# Patient Record
Sex: MALE | Race: WHITE
[De-identification: names, ages, dates, MRNs, and addresses within clinical notes are randomized per-mention and may not be internally consistent; named-entity substitution may affect disease eponyms.]

---

## 2017-03-21 ENCOUNTER — HOSPITAL ENCOUNTER (OUTPATIENT)
Dept: HOSPITAL 47 - LABWHC1 | Age: 52
End: 2017-03-21
Payer: COMMERCIAL

## 2017-03-21 DIAGNOSIS — Z00.00: Primary | ICD-10-CM

## 2017-03-21 DIAGNOSIS — E03.9: ICD-10-CM

## 2017-03-21 DIAGNOSIS — I10: ICD-10-CM

## 2017-03-21 LAB
ALP SERPL-CCNC: 59 U/L (ref 38–126)
ALT SERPL-CCNC: 30 U/L (ref 21–72)
ANION GAP SERPL CALC-SCNC: 12 MMOL/L
AST SERPL-CCNC: 20 U/L (ref 17–59)
BUN SERPL-SCNC: 23 MG/DL (ref 9–20)
CALCIUM SPEC-MCNC: 10.1 MG/DL (ref 8.4–10.2)
CELLS COUNTED: 100
CH: 30.2
CHCM: 33.5
CHLORIDE SERPL-SCNC: 104 MMOL/L (ref 98–107)
CO2 SERPL-SCNC: 28 MMOL/L (ref 22–30)
ERYTHROCYTE [DISTWIDTH] IN BLOOD BY AUTOMATED COUNT: 5.2 M/UL (ref 4.3–5.9)
ERYTHROCYTE [DISTWIDTH] IN BLOOD: 13.2 % (ref 11.5–15.5)
GLUCOSE SERPL-MCNC: 88 MG/DL (ref 74–99)
HCT VFR BLD AUTO: 47.1 % (ref 39–53)
HDW: 2.38
HGB BLD-MCNC: 15.5 GM/DL (ref 13–17.5)
MCH RBC QN AUTO: 29.8 PG (ref 25–35)
MCHC RBC AUTO-ENTMCNC: 32.9 G/DL (ref 31–37)
MCV RBC AUTO: 90.5 FL (ref 80–100)
NON-AFRICAN AMERICAN GFR(MDRD): >60
POTASSIUM SERPL-SCNC: 4.8 MMOL/L (ref 3.5–5.1)
PROT SERPL-MCNC: 7.7 G/DL (ref 6.3–8.2)
SODIUM SERPL-SCNC: 144 MMOL/L (ref 137–145)
WBC # BLD AUTO: 5.5 K/UL (ref 3.8–10.6)
WBC (PEROX): 5.37

## 2017-03-21 PROCEDURE — 85025 COMPLETE CBC W/AUTO DIFF WBC: CPT

## 2017-03-21 PROCEDURE — 80061 LIPID PANEL: CPT

## 2017-03-21 PROCEDURE — 36415 COLL VENOUS BLD VENIPUNCTURE: CPT

## 2017-03-21 PROCEDURE — 84443 ASSAY THYROID STIM HORMONE: CPT

## 2017-03-21 PROCEDURE — 80053 COMPREHEN METABOLIC PANEL: CPT

## 2017-03-21 PROCEDURE — 84439 ASSAY OF FREE THYROXINE: CPT

## 2017-03-22 LAB
CHOLEST SERPL-MCNC: 156 MG/DL (ref ?–200)
HDLC SERPL-MCNC: 33 MG/DL (ref 40–60)
TRIGL SERPL-MCNC: 80 MG/DL (ref ?–150)

## 2017-05-18 ENCOUNTER — HOSPITAL ENCOUNTER (OUTPATIENT)
Dept: HOSPITAL 47 - ORWHC2ENDO | Age: 52
Discharge: HOME | End: 2017-05-18
Payer: COMMERCIAL

## 2017-05-18 VITALS — RESPIRATION RATE: 18 BRPM | SYSTOLIC BLOOD PRESSURE: 113 MMHG | DIASTOLIC BLOOD PRESSURE: 76 MMHG | HEART RATE: 71 BPM

## 2017-05-18 VITALS — BODY MASS INDEX: 23.6 KG/M2

## 2017-05-18 VITALS — TEMPERATURE: 97.3 F

## 2017-05-18 DIAGNOSIS — Z12.11: Primary | ICD-10-CM

## 2017-05-18 DIAGNOSIS — Z79.899: ICD-10-CM

## 2017-05-18 DIAGNOSIS — E07.9: ICD-10-CM

## 2017-05-18 DIAGNOSIS — I10: ICD-10-CM

## 2017-05-18 NOTE — P.PCN
Date of Procedure: 05/18/17


Preoperative Diagnosis: 





Postoperative Diagnosis: 





Procedure(s) Performed: 


Procedure: Total colonoscopy.





Preoperative diagnosis: Screening for neoplasia.





Postoperative diagnosis: Exam within normal limits.





Preparation: HalfLytely prep.





Sedation: Was provided by anesthesia.





Brief clinical history: The patient is a 52-year-old male who is referred for 

this evaluation for screening for neoplasia age being his risk factor.  He has 

no abdominal complaints, bleeding or anemia.  There is no family history of 

colon cancer.  This would be his first colonoscopy.





Procedure: With the patient on his left lateral decubitus position and after 

informed consent and adequate sedation, the perianal area was inspected and it 

did not show any fissures or fistulas.  There were no masses felt on digital 

rectal examination.  The Olympus CFQ 160L video colonoscope was then inserted 

in the rectum in the usual fashion and advanced to the cecum.  The mucosa 

appeared healthy.  No polyps or tumors were seen or any obvious diverticular 

disease.  I retroflexed the endoscope in the rectum before the endoscope was 

withdrawn.





The patient tolerated the procedure well.





Plan: The patient was reassured.  He will follow up with you as planned and I 

recommended repeat exam in 10 years.


Implants: 





Indications for Procedure: 





Operative Findings: 





Description of Procedure:

## 2017-06-30 ENCOUNTER — HOSPITAL ENCOUNTER (OUTPATIENT)
Dept: HOSPITAL 47 - OR | Age: 52
Discharge: HOME | End: 2017-06-30
Attending: SURGERY
Payer: COMMERCIAL

## 2017-06-30 VITALS — TEMPERATURE: 97.7 F

## 2017-06-30 VITALS — HEART RATE: 87 BPM | SYSTOLIC BLOOD PRESSURE: 123 MMHG | DIASTOLIC BLOOD PRESSURE: 79 MMHG

## 2017-06-30 VITALS — RESPIRATION RATE: 16 BRPM

## 2017-06-30 VITALS — BODY MASS INDEX: 23.6 KG/M2

## 2017-06-30 DIAGNOSIS — I10: ICD-10-CM

## 2017-06-30 DIAGNOSIS — E03.9: ICD-10-CM

## 2017-06-30 DIAGNOSIS — K40.20: Primary | ICD-10-CM

## 2017-06-30 DIAGNOSIS — Z79.899: ICD-10-CM

## 2017-06-30 DIAGNOSIS — Z79.891: ICD-10-CM

## 2017-06-30 PROCEDURE — 49650 LAP ING HERNIA REPAIR INIT: CPT

## 2017-06-30 RX ADMIN — POTASSIUM CHLORIDE SCH MLS: 14.9 INJECTION, SOLUTION INTRAVENOUS at 16:25

## 2017-06-30 RX ADMIN — POTASSIUM CHLORIDE SCH MLS: 14.9 INJECTION, SOLUTION INTRAVENOUS at 11:36

## 2017-06-30 NOTE — P.OP
Date of Procedure: 06/30/17


Preoperative Diagnosis: 


Bilateral inguinal hernia


Postoperative Diagnosis: 


Right direct inguinal hernia 


Left large sliding indirect inguinal hernia containing sigmoid colon


Procedure(s) Performed: 


Robotoc asisst lap bilateral inguinal hernia repair with mesh


Implants: 


Progrip 15x10 cm Ref [id number] Lot [id number]


Progrip 15x10 cm Ref [id number]


Anesthesia: GETA


Surgeon: Eleanor Garcia


Pathology: none sent


Condition: stable


Disposition: PACU


Indications for Procedure: 


52 yrs old male presents with bilateral inguinal hernia. Informed consent 

obtained forrobotic assist lap bilateral inguinal hernia repair with mesh 

possible open


Operative Findings: 





Description of Procedure: 


The patient was brought to the operating room and placed in supine position.  

General anesthesia with endotracheal intubation was performed as per anesthesia 

team.  Both arms were tucked against the abdominal wall and a shunt was 

positioned in lithotomy using yellowfin stirrups.  A curiel catheter was 

inserted under sterile aseptic precautions.  Chlorhexidine was used to prep the 

skin followed by application of sterile drapes and Ioban dressing.





A timeout was performed to verify correct patient, correct procedure and 

correct side.  Patient was confirmed to receive perioperative IV antibiotics, 

subcutaneous heparin 5000 units and bilateral SCDs were placed.





A 2 mm skin incision was made in the left subcostal area and Veress needle was 

inserted to establish pneumoperitoneum to a pressure of 15 mmHg.  A 1.5 cm 

supraumbilical incision was made which was deepened through the subcutaneous 

tissue . Two additional 8  mm skin incisions were made on either side of the 

midline approximately 8 cm away.  A 5 mm 30 laparoscope was used to enter the 

peritoneum using direct Optiview technique.  A 8 mm robotic trocar was inserted 

in the subumbilical area and 8 mm robotic trocars were inserted on either side 

of the midline.


The patient was placed in Trendelenburg position  and the robot was brought in 

between the legs.  The robotic arms including the camera arm were docked on the 

trocars.  The robotic prograsp and monopolar scissors were introduced via arm 1 

and 2 respectively.





Upon inspection of the peritoneal cavity, bilateral direct hernias identified.  

The right hernia contained incarcerated omentum which was reduced using gentle 

traction and countertractionThe key anatomical landmarks including the pubic 

symphysis, median and medial umbilical ligaments and bilateral epigastric 

vessels were identified.  Attention was focused on the left side first.  





Using monopolar scissors a peritoneal flap was created extending medially from 

the median umbilical ligament and laterally to the direct hernia space.  Using 

gentle traction and countertraction the flap was developed posteriorly.  Loose 

fibrofatty tissue was bluntly dissected.  Medially the dissection was carried 

along the Lizandro's ligament till pubic tubercle was identified.  Care was taken 

to stay away from the urinary bladder.  Dissection was carried out to leave the 

epigastric vessels against the anterior abdominal wall and laterally beyond the 

hernia defect.  The direct hernia sac was completely reduced.  The iliofemoral 

vessels were identified.  The peritoneal reflection overlying the spermatic 

cord was also dissected off.  Care was taken not to injure any gonadal vessels 

or spermatic cord.  Enough inferior dissection was carried out 2 cm below the 

hernia defect.  No indirect hernia noted.





Attention was then focused on the right side where similar dissection was 

performed to identify the above-mentioned key anatomical landmarks.  Gentle 

traction and countertraction helped in reduction of the hernia sac.  No 

bleeding was encountered.  The iliofemoral vessels, spermatic cord and vas 

deferens were protected at all times.





Laparoscopic parietex progrip mesh was rolled and introduced through the 8mm 

camera port.  The right mesh was placed in the preperitoneal cavity with green 

portion overlying the pubic tubercle . The mesh was rolled upwards so that the 

mesh covered the direct , indirect inguinal hernia and the femoral hernia space 

without any kinks or folds.  Similarly left parietex progrip mesh was rolled 

and introduced through the 8mm camera port and secured over the right direct, 

indirect and femoral hernia space.





The peritoneal flap was then sutured to the cut edge of the peritoneum using 

continuous 2-0 V lock sutures.  The hernia sacs were completely reduced and the 

mesh lay flat without any kinks or folds.





The robotic arms were then undocked and 30 degree laparoscope was inserted.  

All the needles were removed from the abdominal cavity.  The 8mm camera trocar 

site was closed with 2 transfascial sutures of 0 Vicryl.  The sponge, 

instrument and needle count were correct x2.  





The skin was closed with interrupted sutures of 4-0 Monocryl.  Dermabond skin 

glue was applied followed by Telfa and Tegaderm dressing.





Curiel catheter was removed and scrotum was palpated to confirm the position of 

the testicles.  The patient tolerated the procedure well and was taken to post 

anesthesia care unit in stable condition





Dr. Jain was the surgical orta who was present and scrubbed in during the 

entire case.

## 2018-09-18 ENCOUNTER — HOSPITAL ENCOUNTER (OUTPATIENT)
Dept: HOSPITAL 47 - LABWHC1 | Age: 53
End: 2018-09-18
Attending: INTERNAL MEDICINE
Payer: COMMERCIAL

## 2018-09-18 DIAGNOSIS — Z00.00: Primary | ICD-10-CM

## 2018-09-18 DIAGNOSIS — E03.9: ICD-10-CM

## 2018-09-18 DIAGNOSIS — Z12.5: ICD-10-CM

## 2018-09-18 DIAGNOSIS — I10: ICD-10-CM

## 2018-09-18 LAB
ALBUMIN SERPL-MCNC: 4.4 G/DL (ref 3.5–5)
ALP SERPL-CCNC: 59 U/L (ref 38–126)
ALT SERPL-CCNC: 29 U/L (ref 21–72)
ANION GAP SERPL CALC-SCNC: 12 MMOL/L
AST SERPL-CCNC: 24 U/L (ref 17–59)
BASOPHILS # BLD AUTO: 0 K/UL (ref 0–0.2)
BASOPHILS NFR BLD AUTO: 1 %
BUN SERPL-SCNC: 22 MG/DL (ref 9–20)
CALCIUM SPEC-MCNC: 9.9 MG/DL (ref 8.4–10.2)
CHLORIDE SERPL-SCNC: 105 MMOL/L (ref 98–107)
CHOLEST SERPL-MCNC: 157 MG/DL (ref ?–200)
CO2 SERPL-SCNC: 24 MMOL/L (ref 22–30)
EOSINOPHIL # BLD AUTO: 0.1 K/UL (ref 0–0.7)
EOSINOPHIL NFR BLD AUTO: 2 %
ERYTHROCYTE [DISTWIDTH] IN BLOOD BY AUTOMATED COUNT: 5.4 M/UL (ref 4.3–5.9)
ERYTHROCYTE [DISTWIDTH] IN BLOOD: 13.2 % (ref 11.5–15.5)
GLUCOSE SERPL-MCNC: 94 MG/DL (ref 74–99)
HCT VFR BLD AUTO: 48.2 % (ref 39–53)
HDLC SERPL-MCNC: 30 MG/DL (ref 40–60)
HGB BLD-MCNC: 16 GM/DL (ref 13–17.5)
LDLC SERPL CALC-MCNC: 109 MG/DL (ref 0–99)
LYMPHOCYTES # SPEC AUTO: 1.9 K/UL (ref 1–4.8)
LYMPHOCYTES NFR SPEC AUTO: 32 %
MCH RBC QN AUTO: 29.7 PG (ref 25–35)
MCHC RBC AUTO-ENTMCNC: 33.2 G/DL (ref 31–37)
MCV RBC AUTO: 89.3 FL (ref 80–100)
MONOCYTES # BLD AUTO: 0.4 K/UL (ref 0–1)
MONOCYTES NFR BLD AUTO: 7 %
NEUTROPHILS # BLD AUTO: 3.2 K/UL (ref 1.3–7.7)
NEUTROPHILS NFR BLD AUTO: 54 %
PLATELET # BLD AUTO: 330 K/UL (ref 150–450)
POTASSIUM SERPL-SCNC: 4.7 MMOL/L (ref 3.5–5.1)
PROT SERPL-MCNC: 7.8 G/DL (ref 6.3–8.2)
PSA SERPL-MCNC: 0.56 NG/ML (ref 0–4)
SODIUM SERPL-SCNC: 141 MMOL/L (ref 137–145)
T4 FREE SERPL-MCNC: 1.01 NG/DL (ref 0.78–2.19)
TRIGL SERPL-MCNC: 92 MG/DL (ref ?–150)
WBC # BLD AUTO: 6 K/UL (ref 3.8–10.6)

## 2018-09-18 PROCEDURE — 85025 COMPLETE CBC W/AUTO DIFF WBC: CPT

## 2018-09-18 PROCEDURE — 84443 ASSAY THYROID STIM HORMONE: CPT

## 2018-09-18 PROCEDURE — 80053 COMPREHEN METABOLIC PANEL: CPT

## 2018-09-18 PROCEDURE — 84439 ASSAY OF FREE THYROXINE: CPT

## 2018-09-18 PROCEDURE — 80061 LIPID PANEL: CPT

## 2018-09-18 PROCEDURE — 36415 COLL VENOUS BLD VENIPUNCTURE: CPT

## 2019-09-17 ENCOUNTER — HOSPITAL ENCOUNTER (OUTPATIENT)
Dept: HOSPITAL 47 - LABWHC1 | Age: 54
Discharge: HOME | End: 2019-09-17
Attending: INTERNAL MEDICINE
Payer: COMMERCIAL

## 2019-09-17 DIAGNOSIS — Z12.5: ICD-10-CM

## 2019-09-17 DIAGNOSIS — E78.5: ICD-10-CM

## 2019-09-17 DIAGNOSIS — Z00.00: Primary | ICD-10-CM

## 2019-09-17 DIAGNOSIS — I10: ICD-10-CM

## 2019-09-17 LAB
ALBUMIN SERPL-MCNC: 4.6 G/DL (ref 3.8–4.9)
ALBUMIN/GLOB SERPL: 2 G/DL (ref 1.6–3.17)
ALP SERPL-CCNC: 67 U/L (ref 41–126)
ALT SERPL-CCNC: 20 U/L (ref 10–49)
ANION GAP SERPL CALC-SCNC: 6.3 MMOL/L (ref 4–12)
AST SERPL-CCNC: 23 U/L (ref 14–35)
BASOPHILS # BLD AUTO: 0.1 K/UL (ref 0–0.2)
BASOPHILS NFR BLD AUTO: 1 %
BUN SERPL-SCNC: 23 MG/DL (ref 9–27)
BUN/CREAT SERPL: 28.75 RATIO (ref 12–20)
CALCIUM SPEC-MCNC: 9.5 MG/DL (ref 8.7–10.3)
CHLORIDE SERPL-SCNC: 106 MMOL/L (ref 96–109)
CHOLEST SERPL-MCNC: 147 MG/DL (ref 0–200)
CO2 SERPL-SCNC: 26.7 MMOL/L (ref 21.6–31.8)
EOSINOPHIL # BLD AUTO: 0.1 K/UL (ref 0–0.7)
EOSINOPHIL NFR BLD AUTO: 1 %
ERYTHROCYTE [DISTWIDTH] IN BLOOD BY AUTOMATED COUNT: 5.1 M/UL (ref 4.3–5.9)
ERYTHROCYTE [DISTWIDTH] IN BLOOD: 13.5 % (ref 11.5–15.5)
GLOBULIN SER CALC-MCNC: 2.3 G/DL (ref 1.6–3.3)
GLUCOSE SERPL-MCNC: 90 MG/DL (ref 70–110)
HCT VFR BLD AUTO: 45.4 % (ref 39–53)
HDLC SERPL-MCNC: 36 MG/DL (ref 40–60)
HGB BLD-MCNC: 15.4 GM/DL (ref 13–17.5)
LDLC SERPL CALC-MCNC: 93 MG/DL (ref 0–131)
LYMPHOCYTES # SPEC AUTO: 1.9 K/UL (ref 1–4.8)
LYMPHOCYTES NFR SPEC AUTO: 29 %
MCH RBC QN AUTO: 30.1 PG (ref 25–35)
MCHC RBC AUTO-ENTMCNC: 33.9 G/DL (ref 31–37)
MCV RBC AUTO: 88.9 FL (ref 80–100)
MONOCYTES # BLD AUTO: 0.4 K/UL (ref 0–1)
MONOCYTES NFR BLD AUTO: 6 %
NEUTROPHILS # BLD AUTO: 3.9 K/UL (ref 1.3–7.7)
NEUTROPHILS NFR BLD AUTO: 59 %
PLATELET # BLD AUTO: 255 K/UL (ref 150–450)
POTASSIUM SERPL-SCNC: 4.1 MMOL/L (ref 3.5–5.5)
PROT SERPL-MCNC: 6.9 G/DL (ref 6.2–8.2)
SODIUM SERPL-SCNC: 139 MMOL/L (ref 135–145)
T4 FREE SERPL-MCNC: 1.1 NG/DL (ref 0.8–1.8)
TRIGL SERPL-MCNC: 90 MG/DL (ref 0–149)
VLDLC SERPL CALC-MCNC: 18 MG/DL (ref 5–40)
WBC # BLD AUTO: 6.6 K/UL (ref 3.8–10.6)

## 2019-09-17 PROCEDURE — 36415 COLL VENOUS BLD VENIPUNCTURE: CPT

## 2019-09-17 PROCEDURE — 80053 COMPREHEN METABOLIC PANEL: CPT

## 2019-09-17 PROCEDURE — 84443 ASSAY THYROID STIM HORMONE: CPT

## 2019-09-17 PROCEDURE — 84439 ASSAY OF FREE THYROXINE: CPT

## 2019-09-17 PROCEDURE — 85025 COMPLETE CBC W/AUTO DIFF WBC: CPT

## 2019-09-17 PROCEDURE — 80061 LIPID PANEL: CPT

## 2020-06-09 ENCOUNTER — HOSPITAL ENCOUNTER (OUTPATIENT)
Dept: HOSPITAL 47 - LABWHC1 | Age: 55
Discharge: HOME | End: 2020-06-09
Attending: INTERNAL MEDICINE
Payer: COMMERCIAL

## 2020-06-09 DIAGNOSIS — Z12.5: ICD-10-CM

## 2020-06-09 DIAGNOSIS — I10: ICD-10-CM

## 2020-06-09 DIAGNOSIS — E03.9: ICD-10-CM

## 2020-06-09 DIAGNOSIS — Z00.00: Primary | ICD-10-CM

## 2020-06-09 LAB
ALBUMIN SERPL-MCNC: 4.4 G/DL (ref 3.8–4.9)
ALBUMIN/GLOB SERPL: 1.83 G/DL (ref 1.6–3.17)
ALP SERPL-CCNC: 77 U/L (ref 41–126)
ALT SERPL-CCNC: 61 U/L (ref 10–49)
ANION GAP SERPL CALC-SCNC: 7.9 MMOL/L (ref 4–12)
AST SERPL-CCNC: 55 U/L (ref 14–35)
BASOPHILS # BLD AUTO: 0 K/UL (ref 0–0.2)
BASOPHILS NFR BLD AUTO: 0 %
BILIRUB INDIRECT SERPL-MCNC: 0.8 MG/DL
BUN SERPL-SCNC: 23 MG/DL (ref 9–27)
BUN/CREAT SERPL: 28.75 RATIO (ref 12–20)
CALCIUM SPEC-MCNC: 9.5 MG/DL (ref 8.7–10.3)
CHLORIDE SERPL-SCNC: 104 MMOL/L (ref 96–109)
CHOLEST SERPL-MCNC: 207 MG/DL (ref 0–200)
CO2 SERPL-SCNC: 26.1 MMOL/L (ref 21.6–31.8)
EOSINOPHIL # BLD AUTO: 0.1 K/UL (ref 0–0.7)
EOSINOPHIL NFR BLD AUTO: 2 %
ERYTHROCYTE [DISTWIDTH] IN BLOOD BY AUTOMATED COUNT: 4.93 M/UL (ref 4.3–5.9)
ERYTHROCYTE [DISTWIDTH] IN BLOOD: 13.5 % (ref 11.5–15.5)
GLOBULIN SER CALC-MCNC: 2.4 G/DL (ref 1.6–3.3)
GLUCOSE SERPL-MCNC: 77 MG/DL (ref 70–110)
HCT VFR BLD AUTO: 44.5 % (ref 39–53)
HDLC SERPL-MCNC: 46 MG/DL (ref 40–60)
HGB BLD-MCNC: 14.3 GM/DL (ref 13–17.5)
LDLC SERPL CALC-MCNC: 146.4 MG/DL (ref 0–131)
LYMPHOCYTES # SPEC AUTO: 2 K/UL (ref 1–4.8)
LYMPHOCYTES NFR SPEC AUTO: 22 %
MCH RBC QN AUTO: 28.9 PG (ref 25–35)
MCHC RBC AUTO-ENTMCNC: 32.1 G/DL (ref 31–37)
MCV RBC AUTO: 90.3 FL (ref 80–100)
MONOCYTES # BLD AUTO: 0.6 K/UL (ref 0–1)
MONOCYTES NFR BLD AUTO: 7 %
NEUTROPHILS # BLD AUTO: 5.7 K/UL (ref 1.3–7.7)
NEUTROPHILS NFR BLD AUTO: 66 %
PLATELET # BLD AUTO: 224 K/UL (ref 150–450)
POTASSIUM SERPL-SCNC: 4.4 MMOL/L (ref 3.5–5.5)
PROT SERPL-MCNC: 6.8 G/DL (ref 6.2–8.2)
SODIUM SERPL-SCNC: 138 MMOL/L (ref 135–145)
T4 FREE SERPL-MCNC: 1 NG/DL (ref 0.8–1.8)
TRIGL SERPL-MCNC: 73 MG/DL (ref 0–149)
VLDLC SERPL CALC-MCNC: 14.6 MG/DL (ref 5–40)
WBC # BLD AUTO: 8.8 K/UL (ref 3.8–10.6)

## 2020-06-09 PROCEDURE — 82248 BILIRUBIN DIRECT: CPT

## 2020-06-09 PROCEDURE — 36415 COLL VENOUS BLD VENIPUNCTURE: CPT

## 2020-06-09 PROCEDURE — 84443 ASSAY THYROID STIM HORMONE: CPT

## 2020-06-09 PROCEDURE — 84439 ASSAY OF FREE THYROXINE: CPT

## 2020-06-09 PROCEDURE — 85025 COMPLETE CBC W/AUTO DIFF WBC: CPT

## 2020-06-09 PROCEDURE — 80053 COMPREHEN METABOLIC PANEL: CPT

## 2020-06-09 PROCEDURE — 80061 LIPID PANEL: CPT

## 2020-07-21 ENCOUNTER — HOSPITAL ENCOUNTER (OUTPATIENT)
Dept: HOSPITAL 47 - LABWHC1 | Age: 55
Discharge: HOME | End: 2020-07-21
Attending: INTERNAL MEDICINE
Payer: COMMERCIAL

## 2020-07-21 DIAGNOSIS — E78.5: Primary | ICD-10-CM

## 2020-07-21 DIAGNOSIS — I10: ICD-10-CM

## 2020-07-21 LAB
ALBUMIN SERPL-MCNC: 4.5 G/DL (ref 3.8–4.9)
ALBUMIN/GLOB SERPL: 1.88 G/DL (ref 1.6–3.17)
ALP SERPL-CCNC: 66 U/L (ref 41–126)
ALT SERPL-CCNC: 24 U/L (ref 10–49)
AST SERPL-CCNC: 24 U/L (ref 14–35)
BILIRUB INDIRECT SERPL-MCNC: 0.9 MG/DL
CHOLEST SERPL-MCNC: 106 MG/DL (ref 0–200)
GLOBULIN SER CALC-MCNC: 2.4 G/DL (ref 1.6–3.3)
HDLC SERPL-MCNC: 30 MG/DL (ref 40–60)
LDLC SERPL CALC-MCNC: 64 MG/DL (ref 0–131)
PROT SERPL-MCNC: 6.9 G/DL (ref 6.2–8.2)
TRIGL SERPL-MCNC: 60 MG/DL (ref 0–149)
VLDLC SERPL CALC-MCNC: 12 MG/DL (ref 5–40)

## 2020-07-21 PROCEDURE — 36415 COLL VENOUS BLD VENIPUNCTURE: CPT

## 2020-07-21 PROCEDURE — 80076 HEPATIC FUNCTION PANEL: CPT

## 2020-07-21 PROCEDURE — 80061 LIPID PANEL: CPT

## 2021-06-22 ENCOUNTER — HOSPITAL ENCOUNTER (OUTPATIENT)
Dept: HOSPITAL 47 - LABWHC1 | Age: 56
Discharge: HOME | End: 2021-06-22
Attending: INTERNAL MEDICINE
Payer: COMMERCIAL

## 2021-06-22 DIAGNOSIS — Z00.00: Primary | ICD-10-CM

## 2021-06-22 DIAGNOSIS — I10: ICD-10-CM

## 2021-06-22 DIAGNOSIS — E78.00: ICD-10-CM

## 2021-06-22 DIAGNOSIS — E03.9: ICD-10-CM

## 2021-06-22 LAB
ALBUMIN SERPL-MCNC: 4.8 G/DL (ref 3.8–4.9)
ALBUMIN/GLOB SERPL: 1.78 G/DL (ref 1.6–3.17)
ALP SERPL-CCNC: 72 U/L (ref 41–126)
ALT SERPL-CCNC: 22 U/L (ref 10–49)
ANION GAP SERPL CALC-SCNC: 10.6 MMOL/L (ref 4–12)
AST SERPL-CCNC: 21 U/L (ref 14–35)
BASOPHILS # BLD AUTO: 0.03 X 10*3/UL (ref 0–0.1)
BASOPHILS NFR BLD AUTO: 0.5 %
BUN SERPL-SCNC: 20 MG/DL (ref 9–27)
BUN/CREAT SERPL: 22.22 RATIO (ref 12–20)
CALCIUM SPEC-MCNC: 9.7 MG/DL (ref 8.7–10.3)
CHLORIDE SERPL-SCNC: 106 MMOL/L (ref 96–109)
CHOLEST SERPL-MCNC: 125 MG/DL (ref 0–200)
CO2 SERPL-SCNC: 24.4 MMOL/L (ref 21.6–31.8)
EOSINOPHIL # BLD AUTO: 0.08 X 10*3/UL (ref 0.04–0.35)
EOSINOPHIL NFR BLD AUTO: 1.3 %
ERYTHROCYTE [DISTWIDTH] IN BLOOD BY AUTOMATED COUNT: 5.24 X 10*6/UL (ref 4.4–5.6)
ERYTHROCYTE [DISTWIDTH] IN BLOOD: 13.7 % (ref 11.5–14.5)
GLOBULIN SER CALC-MCNC: 2.7 G/DL (ref 1.6–3.3)
GLUCOSE SERPL-MCNC: 95 MG/DL (ref 70–110)
HCT VFR BLD AUTO: 46.9 % (ref 39.6–50)
HDLC SERPL-MCNC: 35 MG/DL (ref 40–60)
HGB BLD-MCNC: 15.2 G/DL (ref 13–17)
LDLC SERPL CALC-MCNC: 75 MG/DL (ref 0–131)
LYMPHOCYTES # SPEC AUTO: 2.06 X 10*3/UL (ref 0.9–5)
LYMPHOCYTES NFR SPEC AUTO: 33.3 %
MCH RBC QN AUTO: 29 PG (ref 27–32)
MCHC RBC AUTO-ENTMCNC: 32.4 G/DL (ref 32–37)
MCV RBC AUTO: 89.5 FL (ref 80–97)
MONOCYTES # BLD AUTO: 0.56 X 10*3/UL (ref 0.2–1)
MONOCYTES NFR BLD AUTO: 9 %
NEUTROPHILS # BLD AUTO: 3.45 X 10*3/UL (ref 1.8–7.7)
NEUTROPHILS NFR BLD AUTO: 55.7 %
PLATELET # BLD AUTO: 290 X 10*3/UL (ref 140–440)
POTASSIUM SERPL-SCNC: 4.4 MMOL/L (ref 3.5–5.5)
PROT SERPL-MCNC: 7.5 G/DL (ref 6.2–8.2)
PSA SERPL-MCNC: 0.4 NG/ML (ref 0–4)
SODIUM SERPL-SCNC: 141 MMOL/L (ref 135–145)
T4 FREE SERPL-MCNC: 1.4 NG/DL (ref 0.8–1.8)
TRIGL SERPL-MCNC: 75 MG/DL (ref 0–149)
VLDLC SERPL CALC-MCNC: 15 MG/DL (ref 5–40)
WBC # BLD AUTO: 6.19 X 10*3/UL (ref 4.5–10)

## 2021-06-22 PROCEDURE — 85025 COMPLETE CBC W/AUTO DIFF WBC: CPT

## 2021-06-22 PROCEDURE — 84439 ASSAY OF FREE THYROXINE: CPT

## 2021-06-22 PROCEDURE — 80053 COMPREHEN METABOLIC PANEL: CPT

## 2021-06-22 PROCEDURE — 36415 COLL VENOUS BLD VENIPUNCTURE: CPT

## 2021-06-22 PROCEDURE — 80061 LIPID PANEL: CPT

## 2021-06-22 PROCEDURE — 84443 ASSAY THYROID STIM HORMONE: CPT

## 2022-09-10 ENCOUNTER — HOSPITAL ENCOUNTER (OUTPATIENT)
Dept: HOSPITAL 47 - LABWHC1 | Age: 57
Discharge: HOME | End: 2022-09-10
Attending: INTERNAL MEDICINE
Payer: COMMERCIAL

## 2022-09-10 DIAGNOSIS — Z00.00: Primary | ICD-10-CM

## 2022-09-10 DIAGNOSIS — E03.9: ICD-10-CM

## 2022-09-10 DIAGNOSIS — E78.5: ICD-10-CM

## 2022-09-10 DIAGNOSIS — I10: ICD-10-CM

## 2022-09-10 LAB
ALBUMIN SERPL-MCNC: 4.7 G/DL (ref 3.8–4.9)
ALBUMIN/GLOB SERPL: 1.68 G/DL (ref 1.6–3.17)
ALP SERPL-CCNC: 74 U/L (ref 41–126)
ALT SERPL-CCNC: 20 U/L (ref 10–49)
ANION GAP SERPL CALC-SCNC: 10.5 MMOL/L (ref 10–18)
AST SERPL-CCNC: 17 U/L (ref 14–35)
BASOPHILS # BLD AUTO: 0.05 X 10*3/UL (ref 0–0.1)
BASOPHILS NFR BLD AUTO: 0.7 %
BUN SERPL-SCNC: 22 MG/DL (ref 9–27)
BUN/CREAT SERPL: 27.5 RATIO (ref 12–20)
CALCIUM SPEC-MCNC: 9.8 MG/DL (ref 8.7–10.3)
CHLORIDE SERPL-SCNC: 103 MMOL/L (ref 96–109)
CHOLEST SERPL-MCNC: 123 MG/DL (ref 0–200)
CO2 SERPL-SCNC: 25.5 MMOL/L (ref 20–27.5)
EOSINOPHIL # BLD AUTO: 0.08 X 10*3/UL (ref 0.04–0.35)
EOSINOPHIL NFR BLD AUTO: 1.2 %
ERYTHROCYTE [DISTWIDTH] IN BLOOD BY AUTOMATED COUNT: 5.27 X 10*6/UL (ref 4.4–5.6)
ERYTHROCYTE [DISTWIDTH] IN BLOOD: 13.3 % (ref 11.5–14.5)
GLOBULIN SER CALC-MCNC: 2.8 G/DL (ref 1.6–3.3)
GLUCOSE SERPL-MCNC: 94 MG/DL (ref 70–110)
HCT VFR BLD AUTO: 46 % (ref 39.6–50)
HDLC SERPL-MCNC: 36 MG/DL (ref 40–60)
HGB BLD-MCNC: 15.6 G/DL (ref 13–17)
IMM GRANULOCYTES BLD QL AUTO: 0.1 %
LDLC SERPL CALC-MCNC: 71.8 MG/DL (ref 0–131)
LYMPHOCYTES # SPEC AUTO: 2.41 X 10*3/UL (ref 0.9–5)
LYMPHOCYTES NFR SPEC AUTO: 36 %
MCH RBC QN AUTO: 29.6 PG (ref 27–32)
MCHC RBC AUTO-ENTMCNC: 33.9 G/DL (ref 32–37)
MCV RBC AUTO: 87.3 FL (ref 80–97)
MONOCYTES # BLD AUTO: 0.61 X 10*3/UL (ref 0.2–1)
MONOCYTES NFR BLD AUTO: 9.1 %
NEUTROPHILS # BLD AUTO: 3.53 X 10*3/UL (ref 1.8–7.7)
NEUTROPHILS NFR BLD AUTO: 52.9 %
NRBC BLD AUTO-RTO: 0 /100 WBCS (ref 0–0)
PLATELET # BLD AUTO: 268 X 10*3/UL (ref 140–440)
POTASSIUM SERPL-SCNC: 4.3 MMOL/L (ref 3.5–5.5)
PROT SERPL-MCNC: 7.5 G/DL (ref 6.2–8.2)
PSA SERPL-MCNC: 0.5 NG/ML (ref 0–3.5)
SODIUM SERPL-SCNC: 139 MMOL/L (ref 135–145)
T4 FREE SERPL-MCNC: 1.64 NG/DL (ref 0.8–1.8)
TRIGL SERPL-MCNC: 75.8 MG/DL (ref 0–149)
VLDLC SERPL CALC-MCNC: 15.16 MG/DL (ref 5–40)
WBC # BLD AUTO: 6.69 X 10*3/UL (ref 4.5–10)

## 2022-09-10 PROCEDURE — 80061 LIPID PANEL: CPT

## 2022-09-10 PROCEDURE — 84439 ASSAY OF FREE THYROXINE: CPT

## 2022-09-10 PROCEDURE — 84443 ASSAY THYROID STIM HORMONE: CPT

## 2022-09-10 PROCEDURE — 80053 COMPREHEN METABOLIC PANEL: CPT

## 2022-09-10 PROCEDURE — 84153 ASSAY OF PSA TOTAL: CPT

## 2022-09-10 PROCEDURE — 85025 COMPLETE CBC W/AUTO DIFF WBC: CPT

## 2022-09-10 PROCEDURE — 36415 COLL VENOUS BLD VENIPUNCTURE: CPT

## 2023-09-18 ENCOUNTER — HOSPITAL ENCOUNTER (OUTPATIENT)
Dept: HOSPITAL 47 - LABWHC1 | Age: 58
Discharge: HOME | End: 2023-09-18
Attending: INTERNAL MEDICINE
Payer: COMMERCIAL

## 2023-09-18 DIAGNOSIS — E03.9: ICD-10-CM

## 2023-09-18 DIAGNOSIS — I10: ICD-10-CM

## 2023-09-18 DIAGNOSIS — E78.5: ICD-10-CM

## 2023-09-18 DIAGNOSIS — Z12.5: ICD-10-CM

## 2023-09-18 DIAGNOSIS — Z00.00: Primary | ICD-10-CM

## 2023-09-18 LAB
ALBUMIN SERPL-MCNC: 4.8 D/DL (ref 3.8–4.9)
ALBUMIN/GLOB SERPL: 1.85 RATIO (ref 1.6–3.17)
ALP SERPL-CCNC: 75 U/L (ref 41–126)
ALT SERPL-CCNC: 20 U/L (ref 10–49)
ANION GAP SERPL CALC-SCNC: 11.2 MMOL/L (ref 4–12)
AST SERPL-CCNC: 18 U/L (ref 14–35)
BASOPHILS # BLD AUTO: 0.03 X 10*3/UL (ref 0–0.1)
BASOPHILS NFR BLD AUTO: 0.5 %
BUN SERPL-SCNC: 19.4 MG/DL (ref 9–27)
BUN/CREAT SERPL: 24.25 RATIO (ref 12–20)
CALCIUM SPEC-MCNC: 9.8 MG/DL (ref 8.7–10.3)
CHLORIDE SERPL-SCNC: 102 MMOL/L (ref 96–109)
CHOLEST SERPL-MCNC: 115 MG/DL (ref 0–200)
CO2 SERPL-SCNC: 25.8 MMOL/L (ref 21.6–31.8)
EOSINOPHIL # BLD AUTO: 0.05 X 10*3/UL (ref 0.04–0.35)
EOSINOPHIL NFR BLD AUTO: 0.8 %
ERYTHROCYTE [DISTWIDTH] IN BLOOD BY AUTOMATED COUNT: 5.38 X 10*6/UL (ref 4.4–5.6)
ERYTHROCYTE [DISTWIDTH] IN BLOOD: 13.5 % (ref 11.5–14.5)
GLOBULIN SER CALC-MCNC: 2.6 D/DL (ref 1.6–3.3)
GLUCOSE SERPL-MCNC: 97 MG/DL (ref 70–110)
HCT VFR BLD AUTO: 47.9 % (ref 39.6–50)
HDLC SERPL-MCNC: 39.4 MG/DL (ref 40–60)
HGB BLD-MCNC: 15.7 D/DL (ref 13–17)
IMM GRANULOCYTES BLD QL AUTO: 0.3 %
LDLC SERPL CALC-MCNC: 64.3 MG/DL (ref 0–131)
LYMPHOCYTES # SPEC AUTO: 1.97 X 10*3/UL (ref 0.9–5)
LYMPHOCYTES NFR SPEC AUTO: 30.9 %
MCH RBC QN AUTO: 29.2 PG (ref 27–32)
MCHC RBC AUTO-ENTMCNC: 32.8 D/DL (ref 32–37)
MCV RBC AUTO: 89 FL (ref 80–97)
MONOCYTES # BLD AUTO: 0.57 X 10*3/UL (ref 0.2–1)
MONOCYTES NFR BLD AUTO: 8.9 %
NEUTROPHILS # BLD AUTO: 3.74 X 10*3/UL (ref 1.8–7.7)
NEUTROPHILS NFR BLD AUTO: 58.6 %
NRBC BLD AUTO-RTO: 0 X 10*3/UL (ref 0–0.01)
PLATELET # BLD AUTO: 264 X 10*3/UL (ref 140–440)
POTASSIUM SERPL-SCNC: 4.6 MMOL/L (ref 3.5–5.5)
PROT SERPL-MCNC: 7.4 D/DL (ref 6.2–8.2)
PSA SERPL-MCNC: 0.5 NG/ML (ref 0–4)
SODIUM SERPL-SCNC: 139 MMOL/L (ref 135–145)
T4 FREE SERPL-MCNC: 1.3 NG/DL (ref 0.8–1.8)
TRIGL SERPL-MCNC: 56.4 MG/DL (ref 0–149)
VLDLC SERPL CALC-MCNC: 11.28 MG/DL (ref 5–40)
WBC # BLD AUTO: 6.38 X 10*3/UL (ref 4.5–10)

## 2023-09-18 PROCEDURE — 84443 ASSAY THYROID STIM HORMONE: CPT

## 2023-09-18 PROCEDURE — 83036 HEMOGLOBIN GLYCOSYLATED A1C: CPT

## 2023-09-18 PROCEDURE — 80053 COMPREHEN METABOLIC PANEL: CPT

## 2023-09-18 PROCEDURE — 36415 COLL VENOUS BLD VENIPUNCTURE: CPT

## 2023-09-18 PROCEDURE — 80061 LIPID PANEL: CPT

## 2023-09-18 PROCEDURE — 84439 ASSAY OF FREE THYROXINE: CPT

## 2023-09-18 PROCEDURE — 85025 COMPLETE CBC W/AUTO DIFF WBC: CPT

## 2024-09-10 ENCOUNTER — HOSPITAL ENCOUNTER (OUTPATIENT)
Dept: HOSPITAL 47 - LABWHC1 | Age: 59
Discharge: HOME | End: 2024-09-10
Attending: INTERNAL MEDICINE
Payer: COMMERCIAL

## 2024-09-10 DIAGNOSIS — Z12.5: ICD-10-CM

## 2024-09-10 DIAGNOSIS — I10: ICD-10-CM

## 2024-09-10 DIAGNOSIS — Z00.00: Primary | ICD-10-CM

## 2024-09-10 DIAGNOSIS — E03.9: ICD-10-CM

## 2024-09-10 DIAGNOSIS — E78.5: ICD-10-CM

## 2024-09-10 LAB
ALBUMIN SERPL-MCNC: 4.5 G/DL (ref 3.8–4.9)
ALBUMIN/GLOB SERPL: 1.67 RATIO (ref 1.6–3.17)
ALP SERPL-CCNC: 71 U/L (ref 41–126)
ALT SERPL-CCNC: 21 U/L (ref 10–49)
ANION GAP SERPL CALC-SCNC: 11.2 MMOL/L (ref 4–12)
AST SERPL-CCNC: 20 U/L (ref 14–35)
BASOPHILS # BLD AUTO: 0.03 X 10*3/UL (ref 0–0.1)
BASOPHILS NFR BLD AUTO: 0.5 %
BUN SERPL-SCNC: 17.7 MG/DL (ref 9–27)
BUN/CREAT SERPL: 17.7 RATIO (ref 12–20)
CALCIUM SPEC-MCNC: 9.8 MG/DL (ref 8.7–10.3)
CHLORIDE SERPL-SCNC: 102 MMOL/L (ref 96–109)
CHOLEST SERPL-MCNC: 109 MG/DL (ref 0–200)
CO2 SERPL-SCNC: 25.8 MMOL/L (ref 21.6–31.8)
EOSINOPHIL # BLD AUTO: 0.07 X 10*3/UL (ref 0.04–0.35)
EOSINOPHIL NFR BLD AUTO: 1.1 %
ERYTHROCYTE [DISTWIDTH] IN BLOOD BY AUTOMATED COUNT: 5.16 X 10*6/UL (ref 4.4–5.6)
ERYTHROCYTE [DISTWIDTH] IN BLOOD: 13.5 % (ref 11.5–14.5)
GLOBULIN SER CALC-MCNC: 2.7 G/DL (ref 1.6–3.3)
GLUCOSE SERPL-MCNC: 94 MG/DL (ref 70–110)
HCT VFR BLD AUTO: 45.9 % (ref 39.6–50)
HDLC SERPL-MCNC: 40.4 MG/DL (ref 40–60)
HGB BLD-MCNC: 15.3 G/DL (ref 13–17)
IMM GRANULOCYTES BLD QL AUTO: 0 %
LDLC SERPL CALC-MCNC: 60.6 MG/DL (ref 0–131)
LYMPHOCYTES # SPEC AUTO: 1.77 X 10*3/UL (ref 0.9–5)
LYMPHOCYTES NFR SPEC AUTO: 27.9 %
MCH RBC QN AUTO: 29.7 PG (ref 27–32)
MCHC RBC AUTO-ENTMCNC: 33.3 G/DL (ref 32–37)
MCV RBC AUTO: 89 FL (ref 80–97)
MONOCYTES # BLD AUTO: 0.61 X 10*3/UL (ref 0.2–1)
MONOCYTES NFR BLD AUTO: 9.6 %
NEUTROPHILS # BLD AUTO: 3.86 X 10*3/UL (ref 1.8–7.7)
NEUTROPHILS NFR BLD AUTO: 60.9 %
NRBC BLD AUTO-RTO: 0 X 10*3/UL (ref 0–0.01)
PLATELET # BLD AUTO: 255 X 10*3/UL (ref 140–440)
POTASSIUM SERPL-SCNC: 4.2 MMOL/L (ref 3.5–5.5)
PROT SERPL-MCNC: 7.2 G/DL (ref 6.2–8.2)
PSA SERPL-MCNC: 0.57 NG/ML (ref 0–4)
SODIUM SERPL-SCNC: 139 MMOL/L (ref 135–145)
T4 FREE SERPL-MCNC: 1.52 NG/DL (ref 0.8–1.8)
TRIGL SERPL-MCNC: 40.1 MG/DL (ref 0–149)
VLDLC SERPL CALC-MCNC: 8.02 MG/DL (ref 5–40)
WBC # BLD AUTO: 6.34 X 10*3/UL (ref 4.5–10)

## 2024-09-10 PROCEDURE — 84443 ASSAY THYROID STIM HORMONE: CPT

## 2024-09-10 PROCEDURE — 84439 ASSAY OF FREE THYROXINE: CPT

## 2024-09-10 PROCEDURE — 36415 COLL VENOUS BLD VENIPUNCTURE: CPT

## 2024-09-10 PROCEDURE — 80061 LIPID PANEL: CPT

## 2024-09-10 PROCEDURE — 83036 HEMOGLOBIN GLYCOSYLATED A1C: CPT

## 2024-09-10 PROCEDURE — 82306 VITAMIN D 25 HYDROXY: CPT

## 2024-09-10 PROCEDURE — 85025 COMPLETE CBC W/AUTO DIFF WBC: CPT

## 2024-09-10 PROCEDURE — 80053 COMPREHEN METABOLIC PANEL: CPT
